# Patient Record
Sex: FEMALE | Race: WHITE
[De-identification: names, ages, dates, MRNs, and addresses within clinical notes are randomized per-mention and may not be internally consistent; named-entity substitution may affect disease eponyms.]

---

## 2017-03-08 ENCOUNTER — HOSPITAL ENCOUNTER (OUTPATIENT)
Dept: HOSPITAL 53 - M ONCR | Age: 67
End: 2017-03-08
Attending: RADIOLOGY
Payer: MEDICARE

## 2017-03-08 DIAGNOSIS — C50.811: Primary | ICD-10-CM

## 2017-03-10 NOTE — RADONC
RADIATION ONCOLOGY FOLLOWUP NOTE

 

DATE:  03/08/2017

 

CHART NUMBER: .

 

DIAGNOSIS:  Right breast cancer.

 

STAGE:  I, [id number].

 

ECOG PERFORMANCE STATUS:  0

 

FOLLOWUP NOTE:

Ms. Rosen is a very pleasant 66-year-old white female with the diagnosis of a

stage I, [id number] moderately differentiated infiltrating ductal carcinoma of the

right breast who is presenting to us today for routine followup visit 12 years

post completion of external beam radiation therapy.

 

The patient presents today reporting she is doing quite well with no complaints

at this time related to her radiation therapy or disease.  She has no breast or

bone pain.

 

REVIEW OF SYSTEMS;

The patient's review of systems is noncontributory. She denies nausea, vomiting,

fevers, chills, night sweats, diplopia, headaches, anxiety or depression,

anorexia, weight loss, visual disturbances, chest pain, urinary or bowel

difficulties, bone pain, or neurological problems.

 

PHYSICAL EXAMINATION:

The patient is a well-developed, well-nourished white female in no acute

distress.

HEENT exam is normocephalic, atraumatic. Extraocular movements are intact.

There is no palpable cervical, supraclavicular, infraclavicular, axillary, or

inguinal lymphadenopathy present.

Lungs are clear to auscultation and percussion.

Heart has a regular rate and rhythm.

Abdomen is benign with no hepatosplenomegaly, masses, or tenderness.

Breast examination reveals no masses or discharge bilaterally.

Skeletal examination reveals no tenderness to pressure or percussion of the bony

skeleton.

Extremities reveal no clubbing, cyanosis, or edema.

Neurologic exam is grossly intact, as is the remainder of the physical

examination.

 

ASSESSMENT:

The patient is clinically PAT at this time and will be seen by us again in 1 year

for further followup.  She will also continue to be followed by her other

physicians as well.

 

 

 

 

 

 

cc:    Obdulia Camargo MD

## 2018-04-18 ENCOUNTER — HOSPITAL ENCOUNTER (OUTPATIENT)
Dept: HOSPITAL 53 - M ONCR | Age: 68
End: 2018-04-18
Attending: RADIOLOGY
Payer: MEDICARE

## 2018-04-18 DIAGNOSIS — Z85.3: ICD-10-CM

## 2018-04-18 DIAGNOSIS — Z92.3: ICD-10-CM

## 2018-04-18 DIAGNOSIS — Z08: Primary | ICD-10-CM

## 2018-08-16 ENCOUNTER — HOSPITAL ENCOUNTER (OUTPATIENT)
Dept: HOSPITAL 53 - M LAB REF | Age: 68
End: 2018-08-16
Attending: RADIOLOGY
Payer: MEDICARE

## 2018-08-16 DIAGNOSIS — C50.911: Primary | ICD-10-CM

## 2018-08-16 PROCEDURE — 88305 TISSUE EXAM BY PATHOLOGIST: CPT

## 2018-09-18 ENCOUNTER — HOSPITAL ENCOUNTER (OUTPATIENT)
Dept: HOSPITAL 53 - M ONCR | Age: 68
End: 2018-09-18
Attending: RADIOLOGY
Payer: MEDICARE

## 2018-09-18 DIAGNOSIS — C50.811: Primary | ICD-10-CM

## 2019-04-24 ENCOUNTER — HOSPITAL ENCOUNTER (OUTPATIENT)
Dept: HOSPITAL 53 - M ONCR | Age: 69
End: 2019-04-24
Attending: RADIOLOGY
Payer: MEDICARE

## 2019-04-24 DIAGNOSIS — Z85.3: Primary | ICD-10-CM

## 2019-04-24 DIAGNOSIS — Z92.3: ICD-10-CM

## 2019-04-30 NOTE — RADONC
RADIATION ONCOLOGY  FOLLOW-UP NOTE

 

DATE:  04/24/2019

 

CHART NUMBER:  

 

DIAGNOSIS:  Right breast cancer.

 

STAGE:  Recurrent.

 

ECOG PERFORMANCE STATUS:  0

 

FOLLOW-UP NOTE:

Ms. Rosen is a very pleasant 68-year-old white female with the diagnosis of a

recurrent right breast infiltrating ductal carcinoma who is presenting to us

today for routine follow-up visit now 14 years and 3 months post completion of

external beam radiation therapy.

 

The patient is well-known to our department and was treated by us in the year

2000 following lumpectomy with standard external beam radiation therapy for

conservative breast management.  She did well until August of last year when she

was found to have a mass in her right breast which was biopsied and turned out to

be a moderately differentiated invasive ductal carcinoma with lobular

infiltrating pattern.  She has since undergone bilateral mastectomies with

reconstruction done on 10/16/2018.  She is undergoing treatment with letrozole

with her medical oncologist, Dr. Torres who is seeing her every 3 months.

 

REVIEW OF SYSTEMS:

The patient's review of systems is noncontributory. She denies nausea, vomiting,

fevers, chills, night sweats, diplopia, headaches, anxiety or depression,

anorexia, weight loss, visual disturbances, chest pain, urinary or bowel

difficulties, bone pain, or neurological problems.

 

PHYSICAL EXAMINATION:

The patient is a well-developed, well-nourished white female in no acute

distress.

HEENT exam is normocephalic, atraumatic. Extraocular movements are intact.

There is no palpable cervical, supraclavicular, infraclavicular, axillary, or

inguinal lymphadenopathy present.

Lungs are clear to auscultation and percussion.

Heart has a regular rate and rhythm.

Abdomen is benign with no hepatosplenomegaly, masses, or tenderness.

Chest wall examination reveals bilateral mastectomies present with

reconstruction.  There is no evidence of nodularity, ulceration or recurrent

disease.

Skeletal examination reveals no tenderness to pressure or percussion of the bony

skeleton.

Extremities reveal no clubbing, cyanosis, or edema.

Neurologic exam is grossly intact, as is the remainder of the physical

examination.

 

ASSESSMENT

The patient is clinically PAT at this time and is doing quite well.  Since she is

being followed and managed so closely by her medical oncologist, Dr. Torres every

3 months, I have discharged her from my follow-up except on a p.r.n. basis.

 

 

 

 

 

 

cc:    MD Cheryl Segura MD Kara Kort, MD

## 2021-03-30 ENCOUNTER — HOSPITAL ENCOUNTER (OUTPATIENT)
Dept: HOSPITAL 53 - M WHC | Age: 71
End: 2021-03-30
Attending: INTERNAL MEDICINE
Payer: MEDICARE

## 2021-03-30 DIAGNOSIS — M85.851: Primary | ICD-10-CM

## 2021-03-30 DIAGNOSIS — M85.852: ICD-10-CM

## 2021-03-30 NOTE — DEXAMM
INDICATION:

OSTEOPENIA.



COMPARISON:

Comparison studies March 29, 2019 and February 26, 2002..



TECHNIQUE:

Bone density was measured using dual-energy x-ray absorptionmetry (DEXA).



FINDINGS:

AP SPINE L1-L4

BMD 1.233 g/cm2

Young Adult T-Score 0.3

Age Matched Z-Score 2.0.



LT FEMUR, TOTAL

BMD 0.836 g/cm2

Young Adult T-Score -1.4

Age Matched Z-Score 0.2.



LT NECK

BMD 0.745 g/cm2

Young Adult T-Score -2.1

Age Matched Z-Score -0.3.



RT FEMUR, TOTAL

BMD 0.856 g/cm2

Young Adult T-Score -1.2

Age Matched Z-Score 0.4.



RT NECK

BMD 0.771 g/cm2

Young Adult T-Score -1.9

Age Matched Z-Score -0.1.



IMPRESSION:

There is normal bone density of the spine.



There is low bone density of the left hip.





There is low bone density of the right hip.



The density of the spine has decreased 1.8% since the initial exam on February 26, 2002.





The density of the spine decrease 0.6% since most recent exam on March 29, 2019.





The density of the left hip has decreased 11.1% since initial exam on February 26, 2002.





The density of the left hip has decreased 10.3% since most recent exam on March 29, 2019.





The density of the right hip has decreased 13.4% since the initial exam on February 26, 2002.





The density of the right hip has decreased 12.3% since the most recent exam on March 29, 2019.



FOLLOW-UP:

Recommendation for the next bone density exam: 2 years.





<Electronically signed by Gurpreet Truong > 03/30/21 [id number]

## 2023-04-03 ENCOUNTER — HOSPITAL ENCOUNTER (OUTPATIENT)
Dept: HOSPITAL 53 - M WHC | Age: 73
End: 2023-04-03
Attending: INTERNAL MEDICINE
Payer: MEDICARE

## 2023-04-03 DIAGNOSIS — M81.0: Primary | ICD-10-CM

## 2024-08-21 ENCOUNTER — HOSPITAL ENCOUNTER (EMERGENCY)
Dept: HOSPITAL 53 - M ED | Age: 74
Discharge: HOME | End: 2024-08-21
Payer: MEDICARE

## 2024-08-21 VITALS — HEIGHT: 65 IN | WEIGHT: 189.4 LBS | BODY MASS INDEX: 31.56 KG/M2

## 2024-08-21 VITALS — SYSTOLIC BLOOD PRESSURE: 139 MMHG | DIASTOLIC BLOOD PRESSURE: 69 MMHG | OXYGEN SATURATION: 98 % | TEMPERATURE: 96.8 F
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
## 2024-08-21 DIAGNOSIS — Z91.030: ICD-10-CM

## 2024-08-21 DIAGNOSIS — Z85.3: ICD-10-CM

## 2024-08-21 DIAGNOSIS — Z79.1: ICD-10-CM
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
## 2024-08-21 DIAGNOSIS — S80.12XA: Primary | ICD-10-CM

## 2024-08-21 DIAGNOSIS — Z79.899: ICD-10-CM

## 2024-08-21 DIAGNOSIS — I10: ICD-10-CM

## 2024-08-21 LAB
BASOPHILS # BLD AUTO: 0.1 10^3/UL (ref 0–0.2)
BASOPHILS NFR BLD AUTO: 0.3 % (ref 0–1)
EOSINOPHIL # BLD AUTO: 0 10^3/UL (ref 0–0.5)
EOSINOPHIL NFR BLD AUTO: 0.3 % (ref 0–3)
HCT VFR BLD AUTO: 44.3 % (ref 36–47)
HGB BLD-MCNC: 14.7 G/DL (ref 12–15.5)
LYMPHOCYTES # BLD AUTO: 1.2 10^3/UL (ref 1.5–5)
LYMPHOCYTES NFR BLD AUTO: 7.9 % (ref 24–44)
MCH RBC QN AUTO: 33.4 PG (ref 27–33)
MCHC RBC AUTO-ENTMCNC: 33.2 G/DL (ref 32–36.5)
MCV RBC AUTO: 100.7 FL (ref 80–96)
MONOCYTES # BLD AUTO: 0.7 10^3/UL (ref 0–0.8)
MONOCYTES NFR BLD AUTO: 4.8 % (ref 2–8)
NEUTROPHILS # BLD AUTO: 12.8 10^3/UL (ref 1.5–8.5)
NEUTROPHILS NFR BLD AUTO: 86.4 % (ref 36–66)
PLATELET # BLD AUTO: 192 10^3/UL (ref 150–450)
RBC # BLD AUTO: 4.4 10^6/UL (ref 4–5.4)
WBC # BLD AUTO: 14.9 10^3/UL (ref 4–10)

## 2024-08-21 PROCEDURE — 80047 BASIC METABLC PNL IONIZED CA: CPT

## 2024-08-21 PROCEDURE — 86900 BLOOD TYPING SEROLOGIC ABO: CPT

## 2024-08-21 PROCEDURE — 73706 CT ANGIO LWR EXTR W/O&W/DYE: CPT

## 2024-08-21 PROCEDURE — 85025 COMPLETE CBC W/AUTO DIFF WBC: CPT

## 2024-08-21 PROCEDURE — 99284 EMERGENCY DEPT VISIT MOD MDM: CPT

## 2024-08-21 PROCEDURE — 93971 EXTREMITY STUDY: CPT

## 2024-08-21 PROCEDURE — 86850 RBC ANTIBODY SCREEN: CPT

## 2024-08-21 PROCEDURE — 36415 COLL VENOUS BLD VENIPUNCTURE: CPT

## 2024-08-21 PROCEDURE — 86901 BLOOD TYPING SEROLOGIC RH(D): CPT

## 2025-04-04 ENCOUNTER — HOSPITAL ENCOUNTER (OUTPATIENT)
Dept: HOSPITAL 53 - M WHC | Age: 75
End: 2025-04-04
Payer: MEDICARE

## 2025-04-04 DIAGNOSIS — M85.89: ICD-10-CM

## 2025-04-04 DIAGNOSIS — M81.0: ICD-10-CM

## 2025-04-04 DIAGNOSIS — Z85.3: Primary | ICD-10-CM

## 2025-04-04 DIAGNOSIS — Z79.899: ICD-10-CM

## 2025-07-15 ENCOUNTER — HOSPITAL ENCOUNTER (OUTPATIENT)
Dept: HOSPITAL 53 - M LAB REF | Age: 75
End: 2025-07-15
Attending: FAMILY MEDICINE
Payer: MEDICARE

## 2025-07-15 DIAGNOSIS — N39.0: Primary | ICD-10-CM
